# Patient Record
(demographics unavailable — no encounter records)

---

## 2024-11-05 NOTE — PHYSICAL EXAM
[Appropriately responsive] : appropriately responsive [Alert] : alert [No Acute Distress] : no acute distress [Soft] : soft [Non-tender] : non-tender [Non-distended] : non-distended [No HSM] : No HSM [No Lesions] : no lesions [No Mass] : no mass [Oriented x3] : oriented x3 [Examination Of The Breasts] : a normal appearance [No Masses] : no breast masses were palpable [Vulvar Atrophy] : vulvar atrophy [Labia Majora] : normal [Labia Minora] : normal [Atrophy] : atrophy [Normal] : normal [Tenderness] : nontender [Enlarged ___ wks] : not enlarged [Mass ___ cm] : no uterine mass was palpated [Uterine Adnexae] : normal [Adnexa Tenderness] : tender [No Tenderness] : no tenderness [FreeTextEntry5] : pap done [FreeTextEntry9] : guaiac-

## 2024-12-03 NOTE — HISTORY OF PRESENT ILLNESS
[Spouse] : spouse [FreeTextEntry1] : pt here for a 3 months follow up [de-identified] : no chest pain, no sob, no cough, no fever, no dizziness, no abdominal pain, no n/v/d/c/melena/brbpr/hematuria/dysuria

## 2024-12-03 NOTE — ASSESSMENT
[FreeTextEntry1] : start ace for kidney protection given diabetes - mild elevated BP  followup fasting labs

## 2025-03-02 NOTE — HEALTH RISK ASSESSMENT
[No] : In the past 12 months have you used drugs other than those required for medical reasons? No [No falls in past year] : Patient reported no falls in the past year [0] : 2) Feeling down, depressed, or hopeless: Not at all (0) [PHQ-2 Negative - No further assessment needed] : PHQ-2 Negative - No further assessment needed [Never] : Never [[id number]] : 0

## 2025-03-02 NOTE — PHYSICAL EXAM
[Normal Sclera/Conjunctiva] : normal sclera/conjunctiva [Normal Outer Ear/Nose] : the outer ears and nose were normal in appearance [No Lymphadenopathy] : no lymphadenopathy [Normal] : normal rate, regular rhythm, normal S1 and S2 and no murmur heard [No Edema] : there was no peripheral edema [Soft] : abdomen soft [Non Tender] : non-tender [Non-distended] : non-distended [Normal Supraclavicular Nodes] : no supraclavicular lymphadenopathy [Normal Anterior Cervical Nodes] : no anterior cervical lymphadenopathy [Grossly Normal Strength/Tone] : grossly normal strength/tone [No Focal Deficits] : no focal deficits [Normal Gait] : normal gait [Speech Grossly Normal] : speech grossly normal [Normal Affect] : the affect was normal [Normal Mood] : the mood was normal

## 2025-03-02 NOTE — HISTORY OF PRESENT ILLNESS
[Spouse] : spouse [FreeTextEntry8] : JUN FULLER is a 61 year F with prediabetes and hyperlipidemia who presents today to request a 2nd opinion RE: medication managment. She currently takes Metformin  mg daily and Lipitor @ 20 mg daily. I reviewed her most recent labs from December of 2024 reveal an A1c of 5.5% and Tchol of 188 with LDL of 103. She feels like she takes 'too much' medication and would like to minimize her regimen. Her family hx is significant for T2DM complicated by diabetic related co-morbidities. She has a brother with what sounds like osteomyelitis of the foot who has undergone amputation. There is another sibling with what appears to be diabetic nephropathy.  Her mother was diabetic and suffered a CVA.

## 2025-03-02 NOTE — ASSESSMENT
[FreeTextEntry1] : I have advised her to remain on her current medication regimen without changes. Unlike her family members, she has no co-morbidities possibly because of the early use of statin and Metformin therapy.

## 2025-03-02 NOTE — HEALTH RISK ASSESSMENT
[No] : In the past 12 months have you used drugs other than those required for medical reasons? No [No falls in past year] : Patient reported no falls in the past year [0] : 2) Feeling down, depressed, or hopeless: Not at all (0) [PHQ-2 Negative - No further assessment needed] : PHQ-2 Negative - No further assessment needed [Never] : Never [[id number]] : 0

## 2025-06-17 NOTE — HISTORY OF PRESENT ILLNESS
[de-identified] :  JUN FULLER is a 62 year F with prediabetes and hyperlipidemia. She currently takes Metformin  mg daily and Lipitor @ 20 mg daily. She is here for f/u of these conditions. She needs updated lab orders and med refills. She takes both vit B12 and vit D-she would like these levels checked as well. She is here c/o left elbow pain for the last 3-4 mos. 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  An OTC analgesic helps temporarily. She is seeking more definitive treatment.

## 2025-06-17 NOTE — HISTORY OF PRESENT ILLNESS
[de-identified] :  JUN FULLER is a 62 year F with prediabetes and hyperlipidemia. She currently takes Metformin  mg daily and Lipitor @ 20 mg daily. She is here for f/u of these conditions. She needs updated lab orders and med refills. She takes both vit B12 and vit D-she would like these levels checked as well. She is here c/o left elbow pain for the last 3-4 mos. 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  An OTC analgesic helps temporarily. She is seeking more definitive treatment.

## 2025-06-17 NOTE — PHYSICAL EXAM
[Normal Sclera/Conjunctiva] : normal sclera/conjunctiva [Normal Outer Ear/Nose] : the outer ears and nose were normal in appearance [Normal] : no respiratory distress, lungs were clear to auscultation bilaterally and no accessory muscle use [Normal Rate] : normal rate  [Regular Rhythm] : with a regular rhythm [Normal S1, S2] : normal S1 and S2 [No Edema] : there was no peripheral edema [Soft] : abdomen soft [Non Tender] : non-tender [Non-distended] : non-distended [No HSM] : no HSM [No CVA Tenderness] : no CVA  tenderness [No Spinal Tenderness] : no spinal tenderness [Grossly Normal Strength/Tone] : grossly normal strength/tone [Normal Gait] : normal gait [Normal Affect] : the affect was normal [Alert and Oriented x3] : oriented to person, place, and time [Normal Mood] : the mood was normal [de-identified] : left lateral epicondyle with tenderness on palpation

## 2025-06-17 NOTE — PHYSICAL EXAM
[Normal Sclera/Conjunctiva] : normal sclera/conjunctiva [Normal Outer Ear/Nose] : the outer ears and nose were normal in appearance [Normal] : no respiratory distress, lungs were clear to auscultation bilaterally and no accessory muscle use [Normal Rate] : normal rate  [Regular Rhythm] : with a regular rhythm [Normal S1, S2] : normal S1 and S2 [No Edema] : there was no peripheral edema [Soft] : abdomen soft [Non Tender] : non-tender [Non-distended] : non-distended [No HSM] : no HSM [No CVA Tenderness] : no CVA  tenderness [No Spinal Tenderness] : no spinal tenderness [Grossly Normal Strength/Tone] : grossly normal strength/tone [Normal Gait] : normal gait [Normal Affect] : the affect was normal [Alert and Oriented x3] : oriented to person, place, and time [Normal Mood] : the mood was normal [de-identified] : left lateral epicondyle with tenderness on palpation

## 2025-06-17 NOTE — PLAN
[FreeTextEntry1] :  THIS VISIT WAS PART OF AN ONGOING LONGITUDAL RELATIONSHIP DESIGNED TO ADDRESS THE MAJORITY OF THE PATIENT'S HEALTH CARE NEEDS WITH CONSISTENCY OVER A LONG PERIOD OF TIME.